# Patient Record
Sex: FEMALE | Race: WHITE | NOT HISPANIC OR LATINO
[De-identification: names, ages, dates, MRNs, and addresses within clinical notes are randomized per-mention and may not be internally consistent; named-entity substitution may affect disease eponyms.]

---

## 2018-10-08 PROBLEM — Z00.00 ENCOUNTER FOR PREVENTIVE HEALTH EXAMINATION: Status: ACTIVE | Noted: 2018-10-08

## 2018-10-09 ENCOUNTER — APPOINTMENT (OUTPATIENT)
Dept: ORTHOPEDIC SURGERY | Facility: CLINIC | Age: 58
End: 2018-10-09
Payer: COMMERCIAL

## 2018-10-09 VITALS
DIASTOLIC BLOOD PRESSURE: 79 MMHG | HEART RATE: 73 BPM | WEIGHT: 115 LBS | BODY MASS INDEX: 18.48 KG/M2 | SYSTOLIC BLOOD PRESSURE: 132 MMHG | HEIGHT: 66 IN

## 2018-10-09 PROCEDURE — 99203 OFFICE O/P NEW LOW 30 MIN: CPT | Mod: 25

## 2018-10-09 PROCEDURE — 20600 DRAIN/INJ JOINT/BURSA W/O US: CPT | Mod: F5

## 2019-03-06 ENCOUNTER — APPOINTMENT (OUTPATIENT)
Dept: ORTHOPEDIC SURGERY | Facility: CLINIC | Age: 59
End: 2019-03-06
Payer: COMMERCIAL

## 2019-03-06 PROCEDURE — 99213 OFFICE O/P EST LOW 20 MIN: CPT | Mod: 25

## 2019-03-06 PROCEDURE — 20600 DRAIN/INJ JOINT/BURSA W/O US: CPT | Mod: F5

## 2019-04-01 NOTE — PHYSICAL EXAM
[de-identified] : - Constitutional: This is a well-developed, well-nourished female, in no obvious distress.  She was accompanied by her  today.\par - Psych: Patient is alert and oriented to person, place and time.  Patient has a normal mood and affect.\par - Cardiovascular: Normal pulses throughout the upper extremities.  \par - Musculoskeletal: Gait is normal.  \par - Neuro: Strength and sensation are intact throughout the upper extremities.  Patient has normal coordination.\par - Respiratory:  Patient exhibits no evidence of shortness of breath or difficulty breathing.\par - Skin: No rashes, lesions, or other abnormalities are noted in the upper extremities.\par \par ---\par \par Examination of her right thumb demonstrates swelling and tenderness in the CMC joint.  There is an adduction contracture.  There is pain with motion.  There is limited motion.  She is neurovascularly intact distally.\par \par Examination of her left hand demonstrates mild tenderness in the CMC joint.  There is no obvious swelling.  There is no contracture.  She is neurovascularly intact distally [de-identified] : \par Prior AP, lateral, and oblique radiographs of the right hand, demonstrated advanced basal joint arthritis of the thumb.\par \par Prior AP, lateral, and oblique radiographs of her left hand, demonstrated mild to moderate CMC joint arthritis of the thumb.

## 2019-04-01 NOTE — PROCEDURE
[FreeTextEntry1] :  -  After a discussion of risks and benefits, the patient agreed to proceed with a cortisone injection.  \par -  Side Injected: Right thumb CMC joint\par -  Medications injected: 0.5cc of 1% lidocaine and 1cc of 40mg of Depomedrol, using sterile technique\par -  Patient tolerated procedure well, without complications.\par -  Patient noted immediate relief of the symptoms, secondary to the anesthetic effects of the injection.\par -  Patient was told that the pain may worsen for a day or two, and should then begin to improve.\par -  Instructions: The patient was instructed on the use of ice, anti-inflammatory agents, or Tylenol, and activity modification.\par -  Follow-up: According to her symptoms.\par

## 2019-04-01 NOTE — DISCUSSION/SUMMARY
[FreeTextEntry1] : Further treatment options were discussed.  At this time, she would like to try a repeat cortisone injection for her.  She does understand that this will not likely provide her with long-term relief, and she should start thinking about surgery at some point in the future.  I discussed the procedure with her, expected recovery period.  She states that she will think about this and may consider surgery after the summer.\par \par I had a discussion regarding today's visit, the diagnosis, and treatment options / recommendations.  She has agreed to this plan of management and has expressed full understanding.  All questions were fully answered to the patient's satisfaction.\par \par I spent at least 25 minutes of face-to-face time with her.  Over 50% of the time spent with the patient was on counseling the patient on the above diagnosis, treatment plan and prognosis.

## 2019-04-01 NOTE — HISTORY OF PRESENT ILLNESS
[FreeTextEntry1] : Follow-up regarding right greater than left thumb pain, secondary to basal joint arthritis.  She was seen in the office 5 months ago and given a cortisone injection on the right side.  She has had injections in the past.\par \par She returns today, as her symptoms have recurred.

## 2019-04-03 ENCOUNTER — APPOINTMENT (OUTPATIENT)
Dept: ORTHOPEDIC SURGERY | Facility: CLINIC | Age: 59
End: 2019-04-03
Payer: COMMERCIAL

## 2019-04-03 PROCEDURE — 99214 OFFICE O/P EST MOD 30 MIN: CPT

## 2019-04-03 NOTE — HISTORY OF PRESENT ILLNESS
[FreeTextEntry1] : 4 weeks status post right thumb basal joint cortisone injection for dance basal joint arthritis.  She has had prior injections.\par \par She returns today, As she is having persistent symptoms, and she would like to discuss scheduling surgery.  She describes her pain as 7 on a 10.

## 2019-04-03 NOTE — DISCUSSION/SUMMARY
[FreeTextEntry1] : Further treatment options were discussed.  At this time, She would like to go ahead and schedule surgery for sometime in May.  I discussed the procedure with her, as well as expected recovery period\par \par -  The nature and purposes of the operation/procedure was discussed in detail.  I discussed the surgical procedure in detail, as well as expected postoperative recovery and outcome.\par -  Possible risks, benefits, and complications (from known and unknown causes) of the procedure were discussed in detail.  \par -  Possible non-operative alternatives to the proposed treatment were discussed in detail.  \par -  She was told that possible risks/complications include, but are not limited to:  Infection, nerve or vessel injury, stiffness, painful scar, poor outcome, need for additional surgical procedures, and other unforeseen complications.  \par -  In addition, the possibility of an "unsuccessful outcome," despite "successful surgery," was discussed with her.  She does understand that there are no absolute guarantee that all her symptoms and function will improve..  \par -  The patient fully understands these risks and wishes to proceed.  \par -  I had a lengthy discussion with the patient regarding today's visit, the diagnosis, and my surgical treatment recommendations.  The patient has agreed to this plan of management and has expressed full understanding.  All questions were fully answered to the patient's satisfaction. \par \par I spent at least 25 minutes of face-to-face time with her.  Over 50% of the time spent with the patient was on counseling the patient on the above diagnosis, treatment plan and prognosis.

## 2019-04-03 NOTE — PHYSICAL EXAM
[de-identified] : - Constitutional: This is a well-developed, well-nourished female, in no obvious distress.  She was accompanied by her  today.\par - Psych: Patient is alert and oriented to person, place and time.  Patient has a normal mood and affect.\par - Cardiovascular: Normal pulses throughout the upper extremities.  \par - Musculoskeletal: Gait is normal.  \par - Neuro: Strength and sensation are intact throughout the upper extremities.  Patient has normal coordination.\par - Respiratory:  Patient exhibits no evidence of shortness of breath or difficulty breathing.\par - Skin: No rashes, lesions, or other abnormalities are noted in the upper extremities.\par \par ---\par \par Examination of her right thumb demonstrates swelling and tenderness in the CMC joint.  There is an adduction contracture.  There is pain with motion.  There is limited motion.  She is neurovascularly intact distally. [de-identified] : \par Prior AP, lateral, and oblique radiographs of the right hand, demonstrated advanced basal joint arthritis of the thumb.\par \par Prior AP, lateral, and oblique radiographs of her left hand, demonstrated mild to moderate CMC joint arthritis of the thumb.

## 2019-04-30 ENCOUNTER — OUTPATIENT (OUTPATIENT)
Dept: OUTPATIENT SERVICES | Facility: HOSPITAL | Age: 59
LOS: 1 days | End: 2019-04-30
Payer: COMMERCIAL

## 2019-04-30 VITALS
SYSTOLIC BLOOD PRESSURE: 170 MMHG | RESPIRATION RATE: 16 BRPM | OXYGEN SATURATION: 98 % | HEART RATE: 75 BPM | DIASTOLIC BLOOD PRESSURE: 77 MMHG | HEIGHT: 66 IN | TEMPERATURE: 98 F | WEIGHT: 121.03 LBS

## 2019-04-30 DIAGNOSIS — Z01.818 ENCOUNTER FOR OTHER PREPROCEDURAL EXAMINATION: ICD-10-CM

## 2019-04-30 DIAGNOSIS — M18.11 UNILATERAL PRIMARY OSTEOARTHRITIS OF FIRST CARPOMETACARPAL JOINT, RIGHT HAND: ICD-10-CM

## 2019-04-30 DIAGNOSIS — M79.644 PAIN IN RIGHT FINGER(S): ICD-10-CM

## 2019-04-30 LAB
ANION GAP SERPL CALC-SCNC: 7 MMOL/L — SIGNIFICANT CHANGE UP (ref 5–17)
BUN SERPL-MCNC: 20 MG/DL — SIGNIFICANT CHANGE UP (ref 7–23)
CALCIUM SERPL-MCNC: 9.5 MG/DL — SIGNIFICANT CHANGE UP (ref 8.4–10.5)
CHLORIDE SERPL-SCNC: 105 MMOL/L — SIGNIFICANT CHANGE UP (ref 96–108)
CO2 SERPL-SCNC: 30 MMOL/L — SIGNIFICANT CHANGE UP (ref 22–31)
CREAT SERPL-MCNC: 0.47 MG/DL — LOW (ref 0.5–1.3)
GLUCOSE SERPL-MCNC: 114 MG/DL — HIGH (ref 70–99)
HCT VFR BLD CALC: 40.8 % — SIGNIFICANT CHANGE UP (ref 34.5–45)
HGB BLD-MCNC: 13.5 G/DL — SIGNIFICANT CHANGE UP (ref 11.5–15.5)
MCHC RBC-ENTMCNC: 30.5 PG — SIGNIFICANT CHANGE UP (ref 27–34)
MCHC RBC-ENTMCNC: 33.1 GM/DL — SIGNIFICANT CHANGE UP (ref 32–36)
MCV RBC AUTO: 92.1 FL — SIGNIFICANT CHANGE UP (ref 80–100)
NRBC # BLD: 0 /100 WBCS — SIGNIFICANT CHANGE UP (ref 0–0)
PLATELET # BLD AUTO: 276 K/UL — SIGNIFICANT CHANGE UP (ref 150–400)
POTASSIUM SERPL-MCNC: 3.9 MMOL/L — SIGNIFICANT CHANGE UP (ref 3.5–5.3)
POTASSIUM SERPL-SCNC: 3.9 MMOL/L — SIGNIFICANT CHANGE UP (ref 3.5–5.3)
RBC # BLD: 4.43 M/UL — SIGNIFICANT CHANGE UP (ref 3.8–5.2)
RBC # FLD: 13.1 % — SIGNIFICANT CHANGE UP (ref 10.3–14.5)
SODIUM SERPL-SCNC: 142 MMOL/L — SIGNIFICANT CHANGE UP (ref 135–145)
WBC # BLD: 5.71 K/UL — SIGNIFICANT CHANGE UP (ref 3.8–10.5)
WBC # FLD AUTO: 5.71 K/UL — SIGNIFICANT CHANGE UP (ref 3.8–10.5)

## 2019-04-30 PROCEDURE — 36415 COLL VENOUS BLD VENIPUNCTURE: CPT

## 2019-04-30 PROCEDURE — 93010 ELECTROCARDIOGRAM REPORT: CPT

## 2019-04-30 PROCEDURE — G0463: CPT

## 2019-04-30 PROCEDURE — 85027 COMPLETE CBC AUTOMATED: CPT

## 2019-04-30 PROCEDURE — 93005 ELECTROCARDIOGRAM TRACING: CPT

## 2019-04-30 PROCEDURE — 80048 BASIC METABOLIC PNL TOTAL CA: CPT

## 2019-04-30 NOTE — H&P PST ADULT - NSICDXPROBLEM_GEN_ALL_CORE_FT
PROBLEM DIAGNOSES  Problem: Thumb pain, right  Assessment and Plan: right thumb basal joint arthroplasty  medical clearance

## 2019-04-30 NOTE — H&P PST ADULT - NSANTHOSAYNRD_GEN_A_CORE
No. PINA screening performed.  STOP BANG Legend: 0-2 = LOW Risk; 3-4 = INTERMEDIATE Risk; 5-8 = HIGH Risk

## 2019-04-30 NOTE — H&P PST ADULT - HISTORY OF PRESENT ILLNESS
57 y/o female with right thumb pain with  decreased ROM for long time. Planned surgery and here for PST in NAD. Denies any acute injury

## 2019-05-06 ENCOUNTER — TRANSCRIPTION ENCOUNTER (OUTPATIENT)
Age: 59
End: 2019-05-06

## 2019-05-07 ENCOUNTER — RESULT REVIEW (OUTPATIENT)
Age: 59
End: 2019-05-07

## 2019-05-07 ENCOUNTER — OUTPATIENT (OUTPATIENT)
Dept: OUTPATIENT SERVICES | Facility: HOSPITAL | Age: 59
LOS: 1 days | End: 2019-05-07
Payer: COMMERCIAL

## 2019-05-07 ENCOUNTER — APPOINTMENT (OUTPATIENT)
Dept: ORTHOPEDIC SURGERY | Facility: HOSPITAL | Age: 59
End: 2019-05-07

## 2019-05-07 VITALS
HEART RATE: 78 BPM | SYSTOLIC BLOOD PRESSURE: 142 MMHG | TEMPERATURE: 98 F | DIASTOLIC BLOOD PRESSURE: 96 MMHG | RESPIRATION RATE: 16 BRPM | OXYGEN SATURATION: 99 %

## 2019-05-07 VITALS
OXYGEN SATURATION: 99 % | SYSTOLIC BLOOD PRESSURE: 190 MMHG | DIASTOLIC BLOOD PRESSURE: 82 MMHG | TEMPERATURE: 98 F | HEIGHT: 66 IN | RESPIRATION RATE: 16 BRPM | WEIGHT: 118.17 LBS | HEART RATE: 85 BPM

## 2019-05-07 DIAGNOSIS — M18.11 UNILATERAL PRIMARY OSTEOARTHRITIS OF FIRST CARPOMETACARPAL JOINT, RIGHT HAND: ICD-10-CM

## 2019-05-07 DIAGNOSIS — Z01.818 ENCOUNTER FOR OTHER PREPROCEDURAL EXAMINATION: ICD-10-CM

## 2019-05-07 PROCEDURE — 26480 TRANSPLANT HAND TENDON: CPT | Mod: RT

## 2019-05-07 PROCEDURE — 88304 TISSUE EXAM BY PATHOLOGIST: CPT | Mod: 26

## 2019-05-07 PROCEDURE — 76000 FLUOROSCOPY <1 HR PHYS/QHP: CPT

## 2019-05-07 PROCEDURE — C1713: CPT

## 2019-05-07 PROCEDURE — 88311 DECALCIFY TISSUE: CPT | Mod: 26

## 2019-05-07 PROCEDURE — 25447 ARTHRP NTRCRP/CRP/MTCR NTRPS: CPT | Mod: RT

## 2019-05-07 PROCEDURE — 25447 ARTHRP NTRCRP/CRP/MTCR NTRPS: CPT | Mod: F5

## 2019-05-07 RX ORDER — HYDROMORPHONE HYDROCHLORIDE 2 MG/ML
0.5 INJECTION INTRAMUSCULAR; INTRAVENOUS; SUBCUTANEOUS
Qty: 0 | Refills: 0 | Status: DISCONTINUED | OUTPATIENT
Start: 2019-05-07 | End: 2019-05-07

## 2019-05-07 RX ORDER — SODIUM CHLORIDE 9 MG/ML
1000 INJECTION, SOLUTION INTRAVENOUS
Qty: 0 | Refills: 0 | Status: DISCONTINUED | OUTPATIENT
Start: 2019-05-07 | End: 2019-05-07

## 2019-05-07 RX ORDER — CEFAZOLIN SODIUM 1 G
2000 VIAL (EA) INJECTION ONCE
Qty: 0 | Refills: 0 | Status: COMPLETED | OUTPATIENT
Start: 2019-05-07 | End: 2019-05-07

## 2019-05-07 RX ORDER — CHLORHEXIDINE GLUCONATE 213 G/1000ML
1 SOLUTION TOPICAL ONCE
Qty: 0 | Refills: 0 | Status: COMPLETED | OUTPATIENT
Start: 2019-05-07 | End: 2019-05-07

## 2019-05-07 RX ORDER — OXYCODONE AND ACETAMINOPHEN 5; 325 MG/1; MG/1
1 TABLET ORAL EVERY 4 HOURS
Qty: 0 | Refills: 0 | Status: DISCONTINUED | OUTPATIENT
Start: 2019-05-07 | End: 2019-05-07

## 2019-05-07 RX ADMIN — SODIUM CHLORIDE 50 MILLILITER(S): 9 INJECTION, SOLUTION INTRAVENOUS at 13:20

## 2019-05-07 RX ADMIN — CHLORHEXIDINE GLUCONATE 1 APPLICATION(S): 213 SOLUTION TOPICAL at 11:59

## 2019-05-07 NOTE — BRIEF OPERATIVE NOTE - NSICDXBRIEFPOSTOP_GEN_ALL_CORE_FT
POST-OP DIAGNOSIS:  Osteoarthritis of carpometacarpal (CMC) joint of thumb 07-May-2019 07:14:35  Felix Jovel

## 2019-05-07 NOTE — ASU DISCHARGE PLAN (ADULT/PEDIATRIC) - CARE PROVIDER_API CALL
Armen Turpin)  Orthopaedic Surgery; Surgery of the Hand  [address], [facility] [[email or web address] [address]  [place] [address]  Phone: [phone]  Fax: [phone]  Follow Up Time:

## 2019-05-07 NOTE — BRIEF OPERATIVE NOTE - NSICDXBRIEFPREOP_GEN_ALL_CORE_FT
PRE-OP DIAGNOSIS:  Osteoarthritis of carpometacarpal (CMC) joint of thumb 07-May-2019 07:14:32 Right Felix Jovel

## 2019-05-07 NOTE — ASU DISCHARGE PLAN (ADULT/PEDIATRIC) - ASU DC SPECIAL INSTRUCTIONSFT
Follow all verbal and written instructions. Take medications as prescribed. DO NOT drive, operate machinery, and/or make important decisions while on prescription pain medication. DO NOT hesitate to call Doctor's office with questions or concerns.       - Call your doctor if you experience:  • An increase in pain not controlled by pain medication or change in activity or  position.  • Temperature greater than 101° F.  • Redness, increased swelling or foul smelling drainage from or around the  incision.  • Numbness, tingling or a change in color or temperature of the operative extremity.  • Call your doctor immediately if you experience chest pain, shortness of breath or calf pain.

## 2019-05-07 NOTE — ASU DISCHARGE PLAN (ADULT/PEDIATRIC) - CALL YOUR DOCTOR IF YOU HAVE ANY OF THE FOLLOWING:
Bleeding that does not stop/Fever greater than (need to indicate Fahrenheit or Celsius)/Numbness, tingling, color or temperature change to extremity/Pain not relieved by Medications/Swelling that gets worse

## 2019-05-08 ENCOUNTER — CHART COPY (OUTPATIENT)
Age: 59
End: 2019-05-08

## 2019-05-08 RX ORDER — OXYCODONE 5 MG/1
5 TABLET ORAL
Qty: 10 | Refills: 0 | Status: ACTIVE | COMMUNITY
Start: 2019-05-08 | End: 1900-01-01

## 2019-05-08 RX ORDER — OXYCODONE 5 MG/1
5 TABLET ORAL
Qty: 10 | Refills: 0 | Status: ACTIVE | COMMUNITY
Start: 2019-05-08

## 2019-05-14 ENCOUNTER — APPOINTMENT (OUTPATIENT)
Dept: ORTHOPEDIC SURGERY | Facility: CLINIC | Age: 59
End: 2019-05-14
Payer: COMMERCIAL

## 2019-05-14 PROCEDURE — 73130 X-RAY EXAM OF HAND: CPT | Mod: RT

## 2019-05-14 PROCEDURE — 29075 APPL CST ELBW FNGR SHORT ARM: CPT | Mod: 58,RT

## 2019-05-14 PROCEDURE — 99024 POSTOP FOLLOW-UP VISIT: CPT

## 2019-05-14 NOTE — HISTORY OF PRESENT ILLNESS
[de-identified] : 1 week postoperative [de-identified] : 1 week status post right thumb basal joint arthroplasty.  Her pain is improving. [de-identified] : Examination of her right thumb, and hand after the splint and dressing were removed demonstrates her incisions to be clean and dry.  There is mild swelling.  There is no suggestion of infection.  Her basal joint arthroplasty is stable to stress testing.  She is neurovascularly intact distally. [de-identified] : AP, lateral, and oblique radiographs of her right hand, demonstrate her basal joint arthroplasty to be well aligned. [de-identified] : Stable, 1 week postoperative. [de-identified] : The suture ends were cut and Steri-Strips were applied.  She was placed into a well-padded, well molded, right short arm fiberglass thumb spica cast.  She was instructed on cast care and activity modification.  She will followup in 3 weeks.  She will follow up before then if she is having any problems.

## 2019-06-05 ENCOUNTER — APPOINTMENT (OUTPATIENT)
Dept: ORTHOPEDIC SURGERY | Facility: CLINIC | Age: 59
End: 2019-06-05
Payer: SELF-PAY

## 2019-06-05 PROCEDURE — 99024 POSTOP FOLLOW-UP VISIT: CPT

## 2019-06-05 PROCEDURE — 73130 X-RAY EXAM OF HAND: CPT | Mod: RT

## 2019-06-05 NOTE — HISTORY OF PRESENT ILLNESS
[de-identified] : 29 days postoperative [de-identified] : Examination of her right thumb after the cast was removed, demonstrates her incisions to be well-healed.  There is decreased swelling.  Her basal joint arthroplasty is stable to stress testing.  It appears that the cast was somewhat irritating her around the IP joint of the thumb.  She is neurovascularly intact distally. [de-identified] : 29 days status post right thumb basal joint arthroplasty.  She is having some pain, secondary to the cast. [de-identified] : Stable, 29 days postoperative. [de-identified] : She was instructed on protection with a splint.  She was instructed on protection, and gentle range of motion exercises.  She was referred to occupational therapy.  She will followup in 3 or 4 weeks.

## 2019-06-19 ENCOUNTER — APPOINTMENT (OUTPATIENT)
Dept: ORTHOPEDIC SURGERY | Facility: CLINIC | Age: 59
End: 2019-06-19
Payer: SELF-PAY

## 2019-06-19 PROCEDURE — 99024 POSTOP FOLLOW-UP VISIT: CPT

## 2019-06-19 PROCEDURE — 73130 X-RAY EXAM OF HAND: CPT | Mod: RT

## 2019-06-19 NOTE — HISTORY OF PRESENT ILLNESS
[de-identified] : 6 weeks postoperative [de-identified] : 6 weeks status post right thumb basal joint arthroplasty.  She is in occupational therapy.  She is improving. [de-identified] : Examination of her right thumb demonstrates her incisions to be well-healed.  There is decreased swelling.  Her basal joint arthroplasty is stable to stress testing. She has improved motion.  She remains neurovascularly intact distally. [de-identified] : AP lateral, and oblique radiographs of her right hand, demonstrate good alignment of her basal joint arthroplasty. [de-identified] : Stable, 6 weeks postoperative. [de-identified] : She will continue occupational therapy in addition to a home exercise program.  She will avoid strenuous activities.  She can wean off of the splint.  She will followup in 6 weeks.

## 2019-07-25 ENCOUNTER — CHART COPY (OUTPATIENT)
Age: 59
End: 2019-07-25

## 2020-09-01 PROBLEM — M18.12 PRIMARY OSTEOARTHRITIS OF FIRST CARPOMETACARPAL JOINT OF LEFT HAND: Status: ACTIVE | Noted: 2018-10-09

## 2020-09-01 PROBLEM — M18.11 PRIMARY OSTEOARTHRITIS OF FIRST CARPOMETACARPAL JOINT OF RIGHT HAND: Status: ACTIVE | Noted: 2018-10-09

## 2020-09-02 ENCOUNTER — APPOINTMENT (OUTPATIENT)
Dept: ORTHOPEDIC SURGERY | Facility: CLINIC | Age: 60
End: 2020-09-02
Payer: COMMERCIAL

## 2020-09-02 VITALS
DIASTOLIC BLOOD PRESSURE: 79 MMHG | HEIGHT: 66 IN | SYSTOLIC BLOOD PRESSURE: 154 MMHG | BODY MASS INDEX: 18.48 KG/M2 | TEMPERATURE: 98.4 F | WEIGHT: 115 LBS | HEART RATE: 65 BPM

## 2020-09-02 DIAGNOSIS — M18.11 UNILATERAL PRIMARY OSTEOARTHRITIS OF FIRST CARPOMETACARPAL JOINT, RIGHT HAND: ICD-10-CM

## 2020-09-02 DIAGNOSIS — M18.12 UNILATERAL PRIMARY OSTEOARTHRITIS OF FIRST CARPOMETACARPAL JOINT, LEFT HAND: ICD-10-CM

## 2020-09-02 PROCEDURE — 99214 OFFICE O/P EST MOD 30 MIN: CPT | Mod: 25

## 2020-09-02 PROCEDURE — 20605 DRAIN/INJ JOINT/BURSA W/O US: CPT | Mod: FA

## 2020-09-02 PROCEDURE — 73130 X-RAY EXAM OF HAND: CPT | Mod: LT

## 2020-09-02 NOTE — PHYSICAL EXAM
[de-identified] : - Constitutional: This is a female in no obvious distress.  She is accompanied by her  today. \par - Psych: Patient is alert and oriented to person, place and time.  Patient has a normal mood and affect.\par - Cardiovascular: Normal pulses throughout the upper extremities.  No significant varicosities are noted in the upper extremities. \par - Neuro: Strength and sensation are intact throughout the upper extremities.  Patient has normal coordination.\par - Respiratory:  Patient exhibits no evidence of shortness of breath or difficulty breathing.\par - Skin: No rashes, lesions, or other abnormalities are noted in the upper extremities.\par \par ---\par \par Examination of her right hand demonstrates her incision to be well-healed.  There is no swelling or tenderness along the CMC joint.  There is no pain with motion.  There is no instability.  She is neurovascularly intact distally.\par \par Examination of her left hand demonstrates swelling and tenderness along the CMC joint.  There is no crepitus or instability.  There is no evidence of de Quervain's tendinitis or trigger thumb.  She is neurovascularly intact distally. [de-identified] : AP, lateral, and oblique radiographs of the left hand demonstrate moderate basal joint arthritis of the thumb.

## 2020-09-02 NOTE — PROCEDURE
[FreeTextEntry1] : - After a discussion of risks and benefits, the patient agreed to proceed with a cortisone injection.  \par -  Side Injected: Left thumb CMC joint.\par -  Medications injected: 0.5cc of 1% Lidocaine and 1cc of 40mg of Depomedrol, using sterile technique.\par -  Patient tolerated the procedure well, without complications.\par -  Patient noted immediate relief of the symptoms, secondary to the anesthetic effects of the injection.\par -  Patient was told that the pain may worsen for a day or two, and should then begin to improve.\par -  Instructions: The patient was instructed on the use of ice, anti-inflammatory agents, or Tylenol, and activity modification.\par -  Follow-up: Within 4 weeks to assess the response to the injection.

## 2020-09-02 NOTE — END OF VISIT
[FreeTextEntry3] : This note was written by Margo Lacy on 09/02/2020 acting solely as a scribe for Dr. Armen Turpin.\par  \par All medical record entries made by the Scribe were at my, Dr. Armen Turpin, direction and personally dictated by me on 09/02/2020. I have personally reviewed the chart and agree that the record accurately reflects my personal performance of the history, physical exam, assessment and plan.

## 2020-09-02 NOTE — HISTORY OF PRESENT ILLNESS
[Right] : right hand dominant [FreeTextEntry1] : Maureen comes in today for evaluation of left thumb pain. Her pain has recently been progressively worsening. Currently she describes pain along the base of her left thumb. Her symptoms are worse with palpation. She rates her pain a 10 out of 10 at this time. \par \par She is approximately 16 months status post right thumb LR TI.  She did well in this regard and was last seen in the office 14 1/2 months ago. She denies any pain or discomfort on the right. \par \par She is accompanied by her .

## 2020-09-02 NOTE — DISCUSSION/SUMMARY
[FreeTextEntry1] : I had a discussion regarding today's visit, the diagnosis and treatment recommendations / options.  At this time, she agreed to proceed with a cortisone injection at the left thumb basal joint. \par \par The patient has agreed to the above plan of management and has expressed full understanding.  All questions were fully answered to the patient's satisfaction.\par \par I spent at least 25 minutes of face-to-face time with the patient.  Over 50% of this time was spent on counseling the patient on the above diagnosis, treatment plan and prognosis.
